# Patient Record
Sex: FEMALE | Race: WHITE | NOT HISPANIC OR LATINO | URBAN - METROPOLITAN AREA
[De-identification: names, ages, dates, MRNs, and addresses within clinical notes are randomized per-mention and may not be internally consistent; named-entity substitution may affect disease eponyms.]

---

## 2021-10-04 PROBLEM — 266433003: Status: ACTIVE | Noted: 2021-10-04

## 2021-10-04 PROBLEM — 235599003: Status: ACTIVE | Noted: 2021-10-04

## 2021-10-05 ENCOUNTER — OFFICE VISIT (OUTPATIENT)
Dept: URBAN - METROPOLITAN AREA CLINIC 74 | Facility: CLINIC | Age: 41
End: 2021-10-05

## 2021-10-05 ENCOUNTER — OFFICE VISIT (OUTPATIENT)
Dept: URBAN - METROPOLITAN AREA TELEHEALTH 2 | Facility: TELEHEALTH | Age: 41
End: 2021-10-05

## 2021-10-05 NOTE — HPI-TODAY'S VISIT:
Patient was last seen in our office on 4/14/2016.  She has a history of heartburn and reflux with accompanying dysphagia.  She underwent EGD with esophageal dilation by Dr. Toledo on 5/4/2016.  This showed evidence of esophagitis and gastritis, biopsies were taken.  Pathology revealed benign gastric inflammation and esophageal biopsies were consistent with eosinophilic esophagitis.

## 2023-10-09 ENCOUNTER — OFFICE VISIT (OUTPATIENT)
Dept: URBAN - METROPOLITAN AREA CLINIC 74 | Facility: CLINIC | Age: 43
End: 2023-10-09

## 2023-10-09 NOTE — HPI-TODAY'S VISIT:
The patient is 42-year-old with past medical history as noted below known to [facility] Dr. Sheikh and Dr. Toledo is presenting to our clinic today with recurrent of oropharyngeal dysphagia.  Procedure: -- EGD with biopsy on 05/04/2016 by Dr. Toledo noted esophageal mucosal changes suspicious for eosinophilic esophagitis.  LA grade B esophagitis.  Erythematous mucosa in the antrum.  No gross lesion in the duodenum.  Biopsy of duodenum with no significant abnormality.  No celiac sprue noted.  Chronic gastritis.  Negative for H.pylori organisms and intestinal metaplasia.  Esophagus with chronic esophagitis consistent with eosinophilic esophagitis (15  HPF) negative for intestinal metaplasia.

## 2023-10-23 ENCOUNTER — LAB OUTSIDE AN ENCOUNTER (OUTPATIENT)
Dept: URBAN - METROPOLITAN AREA CLINIC 74 | Facility: CLINIC | Age: 43
End: 2023-10-23

## 2023-10-23 ENCOUNTER — OFFICE VISIT (OUTPATIENT)
Dept: URBAN - METROPOLITAN AREA CLINIC 74 | Facility: CLINIC | Age: 43
End: 2023-10-23
Payer: COMMERCIAL

## 2023-10-23 VITALS
OXYGEN SATURATION: 98 % | SYSTOLIC BLOOD PRESSURE: 110 MMHG | BODY MASS INDEX: 33.34 KG/M2 | WEIGHT: 212.4 LBS | TEMPERATURE: 98.1 F | HEART RATE: 77 BPM | HEIGHT: 67 IN | DIASTOLIC BLOOD PRESSURE: 74 MMHG

## 2023-10-23 DIAGNOSIS — K21.00 GASTROESOPHAGEAL REFLUX DISEASE WITH ESOPHAGITIS WITHOUT HEMORRHAGE: ICD-10-CM

## 2023-10-23 DIAGNOSIS — K20.0 EOSINOPHILIC ESOPHAGITIS: ICD-10-CM

## 2023-10-23 DIAGNOSIS — R09.A2 GLOBUS SENSATION: ICD-10-CM

## 2023-10-23 DIAGNOSIS — R13.12 OROPHARYNGEAL DYSPHAGIA: ICD-10-CM

## 2023-10-23 DIAGNOSIS — R13.19 ESOPHAGEAL DYSPHAGIA: ICD-10-CM

## 2023-10-23 PROBLEM — 71457002: Status: ACTIVE | Noted: 2023-10-23

## 2023-10-23 PROCEDURE — 99203 OFFICE O/P NEW LOW 30 MIN: CPT | Performed by: PHYSICIAN ASSISTANT

## 2023-10-23 RX ORDER — OMEPRAZOLE 40 MG/1
1 CAPSULE 30 MINUTES BEFORE MORNING MEAL CAPSULE, DELAYED RELEASE ORAL TWICE DAILY
Qty: 60 | Refills: 3 | OUTPATIENT
Start: 2023-10-23

## 2023-10-23 NOTE — HPI-TODAY'S VISIT:
The patient is 42-year-old with past medical history as noted below known to [facility] Dr. Sheikh and Dr. Toledo is presenting to our clinic today with recurrent of oropharyngeal dysphagia. She has recurrent of her symptoms for over 6 mnoths. She has sever inflammation with difficulty swallowing solid food and very symptomatic at bedtime. She ia taking OTC Omeprazole 20 mg with poor response. No change in bowel habits.  No other GI issues today.    -- The patient denies dyspepsia, dysphagia, odynophagia, hemoptysis, hematemesis, nausea, vomiting, regurgitation, melena, constipation, diarrhea, abdominal pain, hematochezia, fever, chills, chest pain, SOB, or any other GI complaints today.  -- The patient denies ETOH, Tobacco, and Illicit drug use.  -- The patient is not up to date with Flu and COVID vaccine.  -- Denies NSAID's.   Procedure: -- EGD with biopsy on 05/04/2016 by Dr. Toledo noted esophageal mucosal changes suspicious for eosinophilic esophagitis.  LA grade B esophagitis.  Erythematous mucosa in the antrum.  No gross lesion in the duodenum.  Biopsy of duodenum with no significant abnormality.  No celiac sprue noted.  Chronic gastritis.  Negative for H.pylori organisms and intestinal metaplasia.  Esophagus with chronic esophagitis consistent with eosinophilic esophagitis (15  HPF) negative for intestinal metaplasia.

## 2023-11-10 ENCOUNTER — OFFICE VISIT (OUTPATIENT)
Dept: URBAN - METROPOLITAN AREA SURGERY CENTER 30 | Facility: SURGERY CENTER | Age: 43
End: 2023-11-10
Payer: COMMERCIAL

## 2023-11-10 ENCOUNTER — CLAIMS CREATED FROM THE CLAIM WINDOW (OUTPATIENT)
Dept: URBAN - METROPOLITAN AREA CLINIC 4 | Facility: CLINIC | Age: 43
End: 2023-11-10
Payer: COMMERCIAL

## 2023-11-10 DIAGNOSIS — K31.89 OTHER DISEASES OF STOMACH AND DUODENUM: ICD-10-CM

## 2023-11-10 DIAGNOSIS — R09.A2 FOREIGN BODY SENSATION IN THROAT: ICD-10-CM

## 2023-11-10 DIAGNOSIS — K20.0 EOSINOPHILIC ESOPHAGITIS: ICD-10-CM

## 2023-11-10 DIAGNOSIS — R13.19 OTHER DYSPHAGIA: ICD-10-CM

## 2023-11-10 DIAGNOSIS — R13.10 DYSPHAGIA: ICD-10-CM

## 2023-11-10 DIAGNOSIS — K29.70 GASTRITIS: ICD-10-CM

## 2023-11-10 DIAGNOSIS — K44.9 HIATAL HERNIA: ICD-10-CM

## 2023-11-10 PROCEDURE — 43450 DILATE ESOPHAGUS 1/MULT PASS: CPT | Performed by: INTERNAL MEDICINE

## 2023-11-10 PROCEDURE — 88305 TISSUE EXAM BY PATHOLOGIST: CPT | Performed by: PATHOLOGY

## 2023-11-10 PROCEDURE — 43239 EGD BIOPSY SINGLE/MULTIPLE: CPT | Performed by: INTERNAL MEDICINE

## 2023-11-10 PROCEDURE — 88312 SPECIAL STAINS GROUP 1: CPT | Performed by: PATHOLOGY

## 2023-11-10 PROCEDURE — 00731 ANES UPR GI NDSC PX NOS: CPT | Performed by: NURSE ANESTHETIST, CERTIFIED REGISTERED

## 2023-11-10 PROCEDURE — G8907 PT DOC NO EVENTS ON DISCHARG: HCPCS | Performed by: INTERNAL MEDICINE

## 2023-11-10 RX ORDER — OMEPRAZOLE 40 MG/1
1 CAPSULE 30 MINUTES BEFORE MORNING MEAL CAPSULE, DELAYED RELEASE ORAL TWICE DAILY
Qty: 60 | Refills: 3 | Status: ACTIVE | COMMUNITY
Start: 2023-10-23

## 2023-11-17 ENCOUNTER — DASHBOARD ENCOUNTERS (OUTPATIENT)
Age: 43
End: 2023-11-17

## 2023-12-14 ENCOUNTER — OFFICE VISIT (OUTPATIENT)
Dept: URBAN - METROPOLITAN AREA CLINIC 74 | Facility: CLINIC | Age: 43
End: 2023-12-14

## 2023-12-14 RX ORDER — OMEPRAZOLE 40 MG/1
1 CAPSULE 30 MINUTES BEFORE MORNING MEAL CAPSULE, DELAYED RELEASE ORAL TWICE DAILY
Qty: 60 | Refills: 3 | Status: ACTIVE | COMMUNITY
Start: 2023-10-23

## 2023-12-14 NOTE — HPI-TODAY'S VISIT:
The patient is 42-year-old with past medical history as noted below known to Dr. Toledo is presenting to our clinic today to discuss her recent EGD results. She continues with Omeprazole 40 mg every 12 hours.    -- The patient denies dyspepsia, dysphagia, odynophagia, hemoptysis, hematemesis, nausea, vomiting, regurgitation, melena, constipation, diarrhea, abdominal pain, hematochezia, fever, chills, chest pain, SOB, or any other GI complaints today.  -- The patient denies ETOH, Tobacco, and Illicit drug use.  -- The patient is not up to date with Flu and COVID vaccine.  -- Denies NSAID's.   Procedures: -- EGD with biopsy on 11/10/2023 by Dr. Toledo noted Z-line, 37 cm from the incisors.  Dilated.  Small hiatal hernia.  Gastritis.  No gross lesion entire examined duodenum. Biopsy of stomach with no significant abnormality.  Esophagus with basal cell hyperplasia and borderline increased intraepithelial eosinophils (20 HPF).  The esophageal biopsy shows a borderline eosinophilic infiltrate in reactive squamous mucosa.  These findings are etiologically nonspecific and CABG seen in the setting of GERD or eosinophilic esophagitis.  -- EGD with biopsy on 05/04/2016 by Dr. Toledo noted esophageal mucosal changes suspicious for eosinophilic esophagitis.  LA grade B esophagitis.  Erythematous mucosa in the antrum.  No gross lesion in the duodenum.  Biopsy of duodenum with no significant abnormality.  No celiac sprue noted.  Chronic gastritis.  Negative for H.pylori organisms and intestinal metaplasia.  Esophagus with chronic esophagitis consistent with eosinophilic esophagitis (15  HPF) negative for intestinal metaplasia.

## 2023-12-20 ENCOUNTER — OFFICE VISIT (OUTPATIENT)
Dept: URBAN - METROPOLITAN AREA CLINIC 74 | Facility: CLINIC | Age: 43
End: 2023-12-20

## 2024-02-21 ENCOUNTER — APPOINTMENT (RX ONLY)
Dept: URBAN - METROPOLITAN AREA CLINIC 162 | Facility: CLINIC | Age: 44
Setting detail: DERMATOLOGY
End: 2024-02-21

## 2024-02-21 DIAGNOSIS — D22 MELANOCYTIC NEVI: ICD-10-CM

## 2024-02-21 PROBLEM — D48.5 NEOPLASM OF UNCERTAIN BEHAVIOR OF SKIN: Status: ACTIVE | Noted: 2024-02-21

## 2024-02-21 PROCEDURE — 11301 SHAVE SKIN LESION 0.6-1.0 CM: CPT

## 2024-02-21 PROCEDURE — ? SHAVE REMOVAL

## 2024-02-21 PROCEDURE — 11311 SHAVE SKIN LESION 0.6-1.0 CM: CPT

## 2024-02-21 ASSESSMENT — LOCATION SIMPLE DESCRIPTION DERM
LOCATION SIMPLE: LEFT BREAST
LOCATION SIMPLE: LEFT CHEEK

## 2024-02-21 ASSESSMENT — LOCATION ZONE DERM
LOCATION ZONE: FACE
LOCATION ZONE: TRUNK

## 2024-02-21 ASSESSMENT — LOCATION DETAILED DESCRIPTION DERM
LOCATION DETAILED: LEFT MEDIAL BREAST 10-11:00 REGION
LOCATION DETAILED: LEFT INFERIOR CENTRAL MALAR CHEEK

## 2024-02-21 NOTE — HPI: MOLE REMOVAL
Is This A New Presentation, Or A Follow-Up?: Removal of Multiple Moles
How Severe Are Your Moles?: mild
Has Your Mole Been Treated?: not been treated
Additional History: Irritated

## 2024-05-21 ENCOUNTER — TELEPHONE ENCOUNTER (OUTPATIENT)
Dept: URBAN - METROPOLITAN AREA CLINIC 74 | Facility: CLINIC | Age: 44
End: 2024-05-21

## 2024-05-21 ENCOUNTER — WEB ENCOUNTER (OUTPATIENT)
Dept: URBAN - METROPOLITAN AREA CLINIC 74 | Facility: CLINIC | Age: 44
End: 2024-05-21

## 2024-05-21 RX ORDER — OMEPRAZOLE 40 MG/1
TAKE ONE CAPSULE BY MOUTH TWICE A DAY 30 MINUTES BEFORE A MORNING MEAL CAPSULE, DELAYED RELEASE ORAL
Qty: 60 CAPSULE | Refills: 0

## 2024-06-19 ENCOUNTER — OFFICE VISIT (OUTPATIENT)
Dept: URBAN - METROPOLITAN AREA CLINIC 74 | Facility: CLINIC | Age: 44
End: 2024-06-19
Payer: COMMERCIAL

## 2024-06-19 VITALS
BODY MASS INDEX: 29.66 KG/M2 | SYSTOLIC BLOOD PRESSURE: 118 MMHG | HEART RATE: 77 BPM | HEIGHT: 67 IN | OXYGEN SATURATION: 97 % | TEMPERATURE: 98.6 F | WEIGHT: 189 LBS | DIASTOLIC BLOOD PRESSURE: 64 MMHG

## 2024-06-19 DIAGNOSIS — K44.9 HIATAL HERNIA: ICD-10-CM

## 2024-06-19 DIAGNOSIS — K20.0 EOSINOPHILIC ESOPHAGITIS: ICD-10-CM

## 2024-06-19 DIAGNOSIS — K22.89 ESOPHAGEAL DILATATION: ICD-10-CM

## 2024-06-19 DIAGNOSIS — R13.19 ESOPHAGEAL DYSPHAGIA: ICD-10-CM

## 2024-06-19 PROCEDURE — 99213 OFFICE O/P EST LOW 20 MIN: CPT | Performed by: PHYSICIAN ASSISTANT

## 2024-06-19 RX ORDER — OMEPRAZOLE 40 MG/1
TAKE ONE CAPSULE BY MOUTH TWICE A DAY 30 MINUTES BEFORE A MORNING MEAL CAPSULE, DELAYED RELEASE ORAL
Qty: 60 CAPSULE | Refills: 0 | Status: DISCONTINUED | COMMUNITY

## 2024-06-19 RX ORDER — OMEPRAZOLE 40 MG/1
1 CAPSULE 30 MINUTES BEFORE MORNING MEAL CAPSULE, DELAYED RELEASE ORAL ONCE A DAY
Qty: 90 | Refills: 3

## 2024-06-19 RX ORDER — TRAZODONE HYDROCHLORIDE 50 MG/1
TABLET ORAL
Qty: 90 TABLET | Status: ACTIVE | COMMUNITY

## 2024-06-19 NOTE — HPI-TODAY'S VISIT:
The patient is 43-year-old with past medical history as noted below known to Dr. Toledo is presenting to our clinic today for follow up appointment. She was continuing  with Omeprazole 40 mg every 12 hours. She sttaed that she is out of her medications and she is using OTC with poor response. No other GI symptoms today. No difficulty swallowing.   Procedures: -- EGD with biopsy on 11/10/2023 by Dr. Toledo noted Z-line, 37 cm from the incisors.  Dilated.  Small hiatal hernia.  Gastritis.  No gross lesion entire examined duodenum. Biopsy of stomach with no significant abnormality.  Esophagus with basal cell hyperplasia and borderline increased intraepithelial eosinophils (20 HPF).  The esophageal biopsy shows a borderline eosinophilic infiltrate in reactive squamous mucosa.    -- EGD with biopsy on 05/04/2016 by Dr. Toledo noted esophageal mucosal changes suspicious for eosinophilic esophagitis.  LA grade B esophagitis.  Erythematous mucosa in the antrum.  No gross lesion in the duodenum.  Biopsy of duodenum with no significant abnormality.  No celiac sprue noted.  Chronic gastritis.  Negative for H.pylori organisms and intestinal metaplasia.  Esophagus with chronic esophagitis consistent with eosinophilic esophagitis (15  HPF) negative for intestinal metaplasia.

## 2024-08-19 ENCOUNTER — OFFICE VISIT (OUTPATIENT)
Dept: URBAN - METROPOLITAN AREA CLINIC 74 | Facility: CLINIC | Age: 44
End: 2024-08-19

## 2024-08-19 RX ORDER — TRAZODONE HYDROCHLORIDE 50 MG/1
TABLET ORAL
Qty: 90 TABLET | Status: ACTIVE | COMMUNITY

## 2024-08-19 RX ORDER — OMEPRAZOLE 40 MG/1
1 CAPSULE 30 MINUTES BEFORE MORNING MEAL CAPSULE, DELAYED RELEASE ORAL ONCE A DAY
Qty: 90 | Refills: 3 | Status: ACTIVE | COMMUNITY

## 2024-09-24 ENCOUNTER — OFFICE VISIT (OUTPATIENT)
Dept: URBAN - METROPOLITAN AREA CLINIC 74 | Facility: CLINIC | Age: 44
End: 2024-09-24
Payer: COMMERCIAL

## 2024-09-24 VITALS
WEIGHT: 185.8 LBS | TEMPERATURE: 97.7 F | SYSTOLIC BLOOD PRESSURE: 110 MMHG | HEART RATE: 85 BPM | BODY MASS INDEX: 29.86 KG/M2 | HEIGHT: 66 IN | DIASTOLIC BLOOD PRESSURE: 78 MMHG

## 2024-09-24 DIAGNOSIS — K29.50 MILD CHRONIC GASTRITIS: ICD-10-CM

## 2024-09-24 DIAGNOSIS — K20.0 EOSINOPHILIC ESOPHAGITIS: ICD-10-CM

## 2024-09-24 DIAGNOSIS — K44.9 HIATAL HERNIA: ICD-10-CM

## 2024-09-24 PROBLEM — 8493009: Status: ACTIVE | Noted: 2024-09-24

## 2024-09-24 PROCEDURE — 99213 OFFICE O/P EST LOW 20 MIN: CPT | Performed by: PHYSICIAN ASSISTANT

## 2024-09-24 RX ORDER — OMEPRAZOLE 40 MG/1
1 CAPSULE 30 MINUTES BEFORE MEAL CAPSULE, DELAYED RELEASE ORAL ONCE A DAY
Qty: 90 | Refills: 3

## 2024-09-24 RX ORDER — OMEPRAZOLE 40 MG/1
1 CAPSULE 30 MINUTES BEFORE MORNING MEAL CAPSULE, DELAYED RELEASE ORAL ONCE A DAY
Qty: 90 | Refills: 3 | Status: ACTIVE | COMMUNITY

## 2024-09-24 RX ORDER — TRAZODONE HYDROCHLORIDE 50 MG/1
TABLET ORAL
Qty: 90 TABLET | Status: ACTIVE | COMMUNITY

## 2024-09-24 NOTE — HPI-TODAY'S VISIT:
The patient is 43-year-old with past medical history as noted below known to Dr. Toledo is presenting to our clinic today for follow up appointment. She was continuing  with Omeprazole 40 mg once daily. She sttaed that she is out of her medications and she is using OTC with poor response. No other GI symptoms today. No difficulty swallowing.   Procedures: -- EGD with biopsy on 11/10/2023 by Dr. Toledo noted Z-line, 37 cm from the incisors.  Dilated.  Small hiatal hernia.  Gastritis.  No gross lesion entire examined duodenum. Biopsy of stomach with no significant abnormality.  Esophagus with basal cell hyperplasia and borderline increased intraepithelial eosinophils (20 HPF).  The esophageal biopsy shows a borderline eosinophilic infiltrate in reactive squamous mucosa.   -- EGD with biopsy on 05/04/2016 by Dr. Toledo noted esophageal mucosal changes suspicious for eosinophilic esophagitis.  LA grade B esophagitis.  Erythematous mucosa in the antrum.  No gross lesion in the duodenum.  Biopsy of duodenum with no significant abnormality.  No celiac sprue noted.  Chronic gastritis.  Negative for H.pylori organisms and intestinal metaplasia.  Esophagus with chronic esophagitis consistent with eosinophilic esophagitis (15  HPF) negative for intestinal metaplasia.

## 2024-10-16 ENCOUNTER — LAB OUTSIDE AN ENCOUNTER (OUTPATIENT)
Dept: URBAN - METROPOLITAN AREA CLINIC 74 | Facility: CLINIC | Age: 44
End: 2024-10-16

## 2024-10-16 ENCOUNTER — OFFICE VISIT (OUTPATIENT)
Dept: URBAN - METROPOLITAN AREA CLINIC 74 | Facility: CLINIC | Age: 44
End: 2024-10-16
Payer: COMMERCIAL

## 2024-10-16 ENCOUNTER — TELEPHONE ENCOUNTER (OUTPATIENT)
Dept: URBAN - METROPOLITAN AREA CLINIC 23 | Facility: CLINIC | Age: 44
End: 2024-10-16

## 2024-10-16 VITALS
HEIGHT: 66 IN | TEMPERATURE: 98.9 F | DIASTOLIC BLOOD PRESSURE: 80 MMHG | BODY MASS INDEX: 30.22 KG/M2 | HEART RATE: 80 BPM | WEIGHT: 188 LBS | SYSTOLIC BLOOD PRESSURE: 108 MMHG

## 2024-10-16 DIAGNOSIS — R11.0 NAUSEA: ICD-10-CM

## 2024-10-16 DIAGNOSIS — R10.824 LEFT LOWER QUADRANT ABDOMINAL TENDERNESS WITH REBOUND TENDERNESS: ICD-10-CM

## 2024-10-16 DIAGNOSIS — K57.30 DIVERTICULOSIS OF SIGMOID COLON: ICD-10-CM

## 2024-10-16 DIAGNOSIS — N20.0 NEPHROLITHIASIS: ICD-10-CM

## 2024-10-16 DIAGNOSIS — K29.50 MILD CHRONIC GASTRITIS: ICD-10-CM

## 2024-10-16 DIAGNOSIS — K20.0 EOSINOPHILIC ESOPHAGITIS: ICD-10-CM

## 2024-10-16 DIAGNOSIS — K44.9 HIATAL HERNIA: ICD-10-CM

## 2024-10-16 DIAGNOSIS — R10.32 LLQ ABDOMINAL PAIN: ICD-10-CM

## 2024-10-16 PROBLEM — 397881000: Status: ACTIVE | Noted: 2024-10-16

## 2024-10-16 PROBLEM — 95570007: Status: ACTIVE | Noted: 2024-10-16

## 2024-10-16 PROCEDURE — 99214 OFFICE O/P EST MOD 30 MIN: CPT | Performed by: PHYSICIAN ASSISTANT

## 2024-10-16 RX ORDER — OMEPRAZOLE 40 MG/1
1 CAPSULE 30 MINUTES BEFORE MEAL CAPSULE, DELAYED RELEASE ORAL ONCE A DAY
Qty: 90 | Refills: 3 | Status: ACTIVE | COMMUNITY

## 2024-10-16 RX ORDER — TRAZODONE HYDROCHLORIDE 50 MG/1
TABLET ORAL
Qty: 90 TABLET | Status: ACTIVE | COMMUNITY

## 2024-10-16 RX ORDER — OMEPRAZOLE 40 MG/1
1 CAPSULE 30 MINUTES BEFORE MEAL CAPSULE, DELAYED RELEASE ORAL ONCE A DAY
Qty: 90 | Refills: 3

## 2024-10-16 NOTE — PHYSICAL EXAM GASTROINTESTINAL
Abdomen , soft, tender at LLQ, nondistended , no guarding or rigidity , no masses palpable , normal bowel sounds , Liver and Spleen,  no hepatosplenomegaly , liver nontender

## 2024-10-16 NOTE — HPI-TODAY'S VISIT:
The patient is 43-year-old with past medical history as noted below known to Dr. Toledo is presenting to our clinic today for follow up appointment. She is continuing  with Omeprazole 40 mg once daily. Her GERD symptoms are well controlled. She is complaining of LUQ and LLQ abdominal and flank pain with known history of kidney stone and recent ELSY with BSO concering for abdominal adhesion per her GYN. Occasional nausea with flank pain.  Intermittent pain and moderate. She moves her bowel everyday.    Procedures: -- EGD with biopsy on 11/10/2023 by Dr. Toledo noted Z-line, 37 cm from the incisors.  Dilated.  Small hiatal hernia.  Gastritis.  No gross lesion entire examined duodenum. Biopsy of stomach with no significant abnormality.  Esophagus with basal cell hyperplasia and borderline increased intraepithelial eosinophils (20 HPF).  The esophageal biopsy shows a borderline eosinophilic infiltrate in reactive squamous mucosa.   -- EGD with biopsy on 05/04/2016 by Dr. Toledo noted esophageal mucosal changes suspicious for eosinophilic esophagitis.  LA grade B esophagitis.  Erythematous mucosa in the antrum.  No gross lesion in the duodenum.  Biopsy of duodenum with no significant abnormality.  No celiac sprue noted.  Chronic gastritis.  Negative for H.pylori organisms and intestinal metaplasia.  Esophagus with chronic esophagitis consistent with eosinophilic esophagitis (15  HPF) negative for intestinal metaplasia.

## 2024-10-17 ENCOUNTER — TELEPHONE ENCOUNTER (OUTPATIENT)
Dept: URBAN - METROPOLITAN AREA CLINIC 74 | Facility: CLINIC | Age: 44
End: 2024-10-17

## 2024-10-17 PROBLEM — 82934008: Status: ACTIVE | Noted: 2024-10-17

## 2024-10-17 LAB
ALBUMIN: 4
ALKALINE PHOSPHATASE: 88
ALT (SGPT): 12
AST (SGOT): 16
BASO (ABSOLUTE): 0.1
BASOS: 1
BILIRUBIN, TOTAL: 0.3
BUN/CREATININE RATIO: 18
BUN: 17
CALCIUM: 8.9
CARBON DIOXIDE, TOTAL: 23
CHLORIDE: 104
CREATININE: 0.94
EGFR: 78
EOS (ABSOLUTE): 0.3
EOS: 4
GLOBULIN, TOTAL: 3.1
GLUCOSE: 85
HEMATOCRIT: 34.9
HEMATOLOGY COMMENTS:: (no result)
HEMOGLOBIN: 11
IMMATURE CELLS: (no result)
IMMATURE GRANS (ABS): 0
IMMATURE GRANULOCYTES: 0
LYMPHS (ABSOLUTE): 2.5
LYMPHS: 34
MCH: 29
MCHC: 31.5
MCV: 92
MONOCYTES(ABSOLUTE): 0.7
MONOCYTES: 9
NEUTROPHILS (ABSOLUTE): 3.8
NEUTROPHILS: 52
NRBC: (no result)
PLATELETS: 354
POTASSIUM: 4.5
PROTEIN, TOTAL: 7.1
RBC: 3.79
RDW: 12.8
SODIUM: 140
WBC: 7.4

## 2024-11-08 ENCOUNTER — LAB OUTSIDE AN ENCOUNTER (OUTPATIENT)
Dept: URBAN - METROPOLITAN AREA CLINIC 74 | Facility: CLINIC | Age: 44
End: 2024-11-08

## 2024-11-08 ENCOUNTER — OFFICE VISIT (OUTPATIENT)
Dept: URBAN - METROPOLITAN AREA CLINIC 74 | Facility: CLINIC | Age: 44
End: 2024-11-08
Payer: COMMERCIAL

## 2024-11-08 VITALS
SYSTOLIC BLOOD PRESSURE: 136 MMHG | WEIGHT: 184.4 LBS | BODY MASS INDEX: 29.63 KG/M2 | HEART RATE: 76 BPM | TEMPERATURE: 97.7 F | DIASTOLIC BLOOD PRESSURE: 80 MMHG | HEIGHT: 66 IN

## 2024-11-08 DIAGNOSIS — M94.0 ACUTE COSTOCHONDRITIS: ICD-10-CM

## 2024-11-08 DIAGNOSIS — K57.90 DIVERTICULOSIS: ICD-10-CM

## 2024-11-08 DIAGNOSIS — K59.04 CHRONIC IDIOPATHIC CONSTIPATION: ICD-10-CM

## 2024-11-08 DIAGNOSIS — R10.32 LLQ ABDOMINAL PAIN: ICD-10-CM

## 2024-11-08 DIAGNOSIS — K20.0 EOSINOPHILIC ESOPHAGITIS: ICD-10-CM

## 2024-11-08 DIAGNOSIS — K44.9 HIATAL HERNIA: ICD-10-CM

## 2024-11-08 PROCEDURE — 99214 OFFICE O/P EST MOD 30 MIN: CPT | Performed by: PHYSICIAN ASSISTANT

## 2024-11-08 RX ORDER — OMEPRAZOLE 40 MG/1
1 CAPSULE 30 MINUTES BEFORE MEAL CAPSULE, DELAYED RELEASE ORAL ONCE A DAY
Qty: 90 | Refills: 3 | Status: ACTIVE | COMMUNITY

## 2024-11-08 RX ORDER — FLUOXETINE HYDROCHLORIDE 10 MG/1
1 CAPSULE CAPSULE ORAL ONCE A DAY
Status: ACTIVE | COMMUNITY
Start: 2024-06-19

## 2024-11-08 RX ORDER — LINACLOTIDE 72 UG/1
1 CAPSULE AT LEAST 30 MINUTES BEFORE THE FIRST MEAL OF THE DAY ON AN EMPTY STOMACH CAPSULE, GELATIN COATED ORAL ONCE A DAY
Qty: 90 | Refills: 3 | OUTPATIENT
Start: 2024-11-08 | End: 2025-11-03

## 2024-11-08 RX ORDER — TRAZODONE HYDROCHLORIDE 50 MG/1
TABLET ORAL
Qty: 90 TABLET | Status: ACTIVE | COMMUNITY

## 2024-11-08 RX ORDER — TIRZEPATIDE 2.5 MG/.5ML
0.5 ML INJECTION, SOLUTION SUBCUTANEOUS
Status: ACTIVE | COMMUNITY
Start: 2024-06-19

## 2024-11-08 NOTE — HPI-TODAY'S VISIT:
The patient is 43-year-old with past medical history as noted below known to Dr. Toledo is presenting to our clinic today for follow up appointment to discuss labs and CT results. She is continuing  with Omeprazole 40 mg once daily. She was given samples of Linzess 72 mcg once daily for constipation last visit.  She is doing well on Linzess 72 mcg once daily with bowel movement daily. She continue s with LUQ flank and abdomen pain. No other Gi issues today.   Diagnostic sudies: -- CT scan of abdomen and pelvis on 10/17/2024 with no acute abdominal or pelvic abnormality.  Possible enteroenteric intussusception in the right mid abdomen, which is almost certainly transient. Small bowel is otherwise unremarkable. Sigmoid colonic diverticulosis without acute diverticulitis.  Prior cholecystectomy, appendectomy, and hysterectomy. Multiple subcentimeter hypoattenuating liver lesions which are too small to adequately characterize but likely reflect cysts.  -- Labs on 10/16/2024 CMP with BUN 17, creatinine 0.94, ALP 88, AST 16, ALT 12, and TB 0.3.  CBC with WBC 7.4, hemoglobin 11.0, hematocrit 34.9, and platelet 354.   Procedures: -- EGD with biopsy on 11/10/2023 by Dr. Toledo noted Z-line, 37 cm from the incisors.  Dilated.  Small hiatal hernia.  Gastritis.  No gross lesion entire examined duodenum. Biopsy of stomach with no significant abnormality.  Esophagus with basal cell hyperplasia and borderline increased intraepithelial eosinophils (20 HPF).  The esophageal biopsy shows a borderline eosinophilic infiltrate in reactive squamous mucosa.   -- EGD with biopsy on 05/04/2016 by Dr. Toledo noted esophageal mucosal changes suspicious for eosinophilic esophagitis.  LA grade B esophagitis.  Erythematous mucosa in the antrum.  No gross lesion in the duodenum.  Biopsy of duodenum with no significant abnormality.  No celiac sprue noted.  Chronic gastritis.  Negative for H.pylori organisms and intestinal metaplasia.  Esophagus with chronic esophagitis consistent with eosinophilic esophagitis (15  HPF) negative for intestinal metaplasia.

## 2024-11-14 ENCOUNTER — CLAIMS CREATED FROM THE CLAIM WINDOW (OUTPATIENT)
Dept: URBAN - METROPOLITAN AREA CLINIC 4 | Facility: CLINIC | Age: 44
End: 2024-11-14
Payer: COMMERCIAL

## 2024-11-14 ENCOUNTER — CLAIMS CREATED FROM THE CLAIM WINDOW (OUTPATIENT)
Dept: URBAN - METROPOLITAN AREA SURGERY CENTER 30 | Facility: SURGERY CENTER | Age: 44
End: 2024-11-14
Payer: COMMERCIAL

## 2024-11-14 DIAGNOSIS — Z12.11 COLON CANCER SCREENING: ICD-10-CM

## 2024-11-14 DIAGNOSIS — R10.32 ABDOMINAL PAIN, LEFT LOWER QUADRANT: ICD-10-CM

## 2024-11-14 DIAGNOSIS — D17.9 BENIGN LIPOMATOUS NEOPLASM, UNSPECIFIED: ICD-10-CM

## 2024-11-14 DIAGNOSIS — R19.4 CHANGE IN BOWEL HABIT,: ICD-10-CM

## 2024-11-14 DIAGNOSIS — K63.89 OTHER SPECIFIED DISEASES OF INTESTINE: ICD-10-CM

## 2024-11-14 DIAGNOSIS — D17.5 BENIGN LIPOMATOUS NEOPLASM OF INTRA-ABDOMINAL ORGANS: ICD-10-CM

## 2024-11-14 DIAGNOSIS — D17.9 LIPOMA: ICD-10-CM

## 2024-11-14 PROCEDURE — 88313 SPECIAL STAINS GROUP 2: CPT | Performed by: PATHOLOGY

## 2024-11-14 PROCEDURE — 45380 COLONOSCOPY AND BIOPSY: CPT | Performed by: INTERNAL MEDICINE

## 2024-11-14 PROCEDURE — 00812 ANES LWR INTST SCR COLSC: CPT | Performed by: NURSE ANESTHETIST, CERTIFIED REGISTERED

## 2024-11-14 PROCEDURE — 88342 IMHCHEM/IMCYTCHM 1ST ANTB: CPT | Performed by: PATHOLOGY

## 2024-11-14 PROCEDURE — 88305 TISSUE EXAM BY PATHOLOGIST: CPT | Performed by: PATHOLOGY

## 2024-11-14 RX ORDER — OMEPRAZOLE 40 MG/1
1 CAPSULE 30 MINUTES BEFORE MEAL CAPSULE, DELAYED RELEASE ORAL ONCE A DAY
Qty: 90 | Refills: 3 | Status: ACTIVE | COMMUNITY

## 2024-11-14 RX ORDER — FLUOXETINE HYDROCHLORIDE 10 MG/1
1 CAPSULE CAPSULE ORAL ONCE A DAY
Status: ACTIVE | COMMUNITY
Start: 2024-06-19

## 2024-11-14 RX ORDER — TRAZODONE HYDROCHLORIDE 50 MG/1
TABLET ORAL
Qty: 90 TABLET | Status: ACTIVE | COMMUNITY

## 2024-11-14 RX ORDER — TIRZEPATIDE 2.5 MG/.5ML
0.5 ML INJECTION, SOLUTION SUBCUTANEOUS
Status: ACTIVE | COMMUNITY
Start: 2024-06-19

## 2024-11-14 RX ORDER — LINACLOTIDE 72 UG/1
1 CAPSULE AT LEAST 30 MINUTES BEFORE THE FIRST MEAL OF THE DAY ON AN EMPTY STOMACH CAPSULE, GELATIN COATED ORAL ONCE A DAY
Qty: 90 | Refills: 3 | Status: ACTIVE | COMMUNITY
Start: 2024-11-08 | End: 2025-11-03

## 2024-12-19 ENCOUNTER — OFFICE VISIT (OUTPATIENT)
Dept: URBAN - METROPOLITAN AREA CLINIC 74 | Facility: CLINIC | Age: 44
End: 2024-12-19

## 2024-12-19 NOTE — HPI-TODAY'S VISIT:
The patient is 43-year-old with past medical history as noted below known to Dr. Toledo is presenting to our clinic today for follow up appointment to discuss her Colonoscopy results. She is continuing  with Omeprazole 40 mg once daily. She was given samples of Linzess 72 mcg once daily for constipation with good response.   Diagnostic sudies: -- CT scan of abdomen and pelvis on 10/17/2024 with no acute abdominal or pelvic abnormality.  Possible enteroenteric intussusception in the right mid abdomen, which is almost certainly transient. Small bowel is otherwise unremarkable. Sigmoid colonic diverticulosis without acute diverticulitis.  Prior cholecystectomy, appendectomy, and hysterectomy. Multiple subcentimeter hypoattenuating liver lesions which are too small to adequately characterize but likely reflect cysts.  -- Labs on 10/16/2024 CMP with BUN 17, creatinine 0.94, ALP 88, AST 16, ALT 12, and TB 0.3.  CBC with WBC 7.4, hemoglobin 11.0, hematocrit 34.9, and platelet 354.   Procedures: -- Colonoscopy with polypectomy on 11/14/2024 by Dr. Toledo noted the examined portion of the ileum was normal. One 4 mm polyp was found at the rectosigmoid colon, removed with a cold biopsy forceps.  Resected and retrieved.  Diverticulosis in sigmoid colon.  Internal hemorrhoids.  Biopsy with submucosal lipoma.  Random biopsy with no significant abnormality. Repeat Colonoscopy in 10 years for surveillance.   -- EGD with biopsy on 11/10/2023 by Dr. Toledo noted Z-line, 37 cm from the incisors.  Dilated.  Small hiatal hernia.  Gastritis.  No gross lesion entire examined duodenum. Biopsy of stomach with no significant abnormality.  Esophagus with basal cell hyperplasia and borderline increased intraepithelial eosinophils (20 HPF).  The esophageal biopsy shows a borderline eosinophilic infiltrate in reactive squamous mucosa.   -- EGD with biopsy on 05/04/2016 by Dr. Toledo noted esophageal mucosal changes suspicious for eosinophilic esophagitis.  LA grade B esophagitis.  Erythematous mucosa in the antrum.  No gross lesion in the duodenum.  Biopsy of duodenum with no significant abnormality.  No celiac sprue noted.  Chronic gastritis.  Negative for H.pylori organisms and intestinal metaplasia.  Esophagus with chronic esophagitis consistent with eosinophilic esophagitis (15  HPF) negative for intestinal metaplasia.